# Patient Record
Sex: MALE | Race: WHITE | NOT HISPANIC OR LATINO | Employment: FULL TIME | URBAN - METROPOLITAN AREA
[De-identification: names, ages, dates, MRNs, and addresses within clinical notes are randomized per-mention and may not be internally consistent; named-entity substitution may affect disease eponyms.]

---

## 2024-08-29 ENCOUNTER — OFFICE VISIT (OUTPATIENT)
Dept: URGENT CARE | Facility: URGENT CARE | Age: 54
End: 2024-08-29
Payer: COMMERCIAL

## 2024-08-29 VITALS
HEIGHT: 72 IN | TEMPERATURE: 98.3 F | WEIGHT: 185 LBS | SYSTOLIC BLOOD PRESSURE: 134 MMHG | BODY MASS INDEX: 25.06 KG/M2 | DIASTOLIC BLOOD PRESSURE: 81 MMHG | RESPIRATION RATE: 18 BRPM | OXYGEN SATURATION: 97 % | HEART RATE: 78 BPM

## 2024-08-29 DIAGNOSIS — T63.481A LOCAL REACTION TO HYMENOPTERA STING: Primary | ICD-10-CM

## 2024-08-29 PROCEDURE — 99203 OFFICE O/P NEW LOW 30 MIN: CPT | Performed by: PHYSICIAN ASSISTANT

## 2024-08-29 RX ORDER — ATORVASTATIN CALCIUM 10 MG/1
TABLET, FILM COATED ORAL
COMMUNITY

## 2024-08-29 RX ORDER — SILDENAFIL 100 MG/1
TABLET, FILM COATED ORAL
COMMUNITY
Start: 2024-03-22

## 2024-08-29 RX ORDER — CETIRIZINE HYDROCHLORIDE 10 MG/1
10 TABLET ORAL DAILY
Qty: 30 TABLET | Refills: 0 | Status: SHIPPED | OUTPATIENT
Start: 2024-08-29

## 2024-08-29 RX ORDER — LOSARTAN POTASSIUM 50 MG/1
TABLET ORAL
COMMUNITY
Start: 2024-03-22

## 2024-08-29 ASSESSMENT — ENCOUNTER SYMPTOMS
FEVER: 0
SHORTNESS OF BREATH: 0
VOMITING: 0
SORE THROAT: 0

## 2024-08-29 NOTE — PROGRESS NOTES
Assessment & Plan:        ICD-10-CM    1. Local reaction to hymenoptera sting  T63.481A cetirizine (ZYRTEC) 10 MG tablet            Plan/Clinical Decision Making:    Patient with acute bug bite on left medial ankle today. Has small erythematous papule on ankle with surrounding swelling with faint erythema. Minimal tenderness. No induration.     Patient Instructions   Take Zyrtec daily or up to twice a day for swelling and itching.     Use cool compress to help.     You can continue to use hydrocortisone, add calamine if needed.     Return if symptoms worsen or fail to improve, for in 3-5 days.     At the end of the encounter, I discussed results, diagnosis, medications. Discussed red flags for immediate return to clinic/ER, as well as indications for follow up if no improvement. Patient understood and agreed to plan. Patient was stable for discharge.        Deyanira Bal PA-C on 8/29/2024 at 6:52 PM          Subjective:     HPI:    Eusebio is a 54 year old male who presents to clinic today for the following health issues:  Chief Complaint   Patient presents with    Urgent Care    Insect Bite     Pt in clinic to have eval for left leg bug bite.     HPI    Patient with bug bite today medial left ankle. Has a bit of swelling, erythema-mild.   Mild tenderness.     Review of Systems   Constitutional:  Negative for fever.   HENT:  Negative for sore throat.    Respiratory:  Negative for shortness of breath.    Gastrointestinal:  Negative for vomiting.       There is no problem list on file for this patient.       No past medical history on file.    Social History     Tobacco Use    Smoking status: Not on file    Smokeless tobacco: Not on file   Substance Use Topics    Alcohol use: Not on file             Objective:     Vitals:    08/29/24 1831   BP: 134/81   Pulse: 78   Resp: 18   Temp: 98.3  F (36.8  C)   TempSrc: Oral   SpO2: 97%   Weight: 83.9 kg (185 lb)   Height: 1.829 m (6')         Physical Exam   EXAM:    Pleasant, alert, appropriate appearance. NAD.  Head Exam: Normocephalic, atraumatic.  Ext/musculoskeletal: good ROM of left ankle  Neuro: CN II-XII intact grossly intact.  Skin: papule medial ankle with mild erythema, swelling of ankle. No induration.       Results:  No results found for any visits on 08/29/24.

## 2024-08-30 NOTE — PATIENT INSTRUCTIONS
Take Zyrtec daily or up to twice a day for swelling and itching.     Use cool compress to help.     You can continue to use hydrocortisone, add calamine if needed.